# Patient Record
Sex: FEMALE | Race: WHITE | URBAN - METROPOLITAN AREA
[De-identification: names, ages, dates, MRNs, and addresses within clinical notes are randomized per-mention and may not be internally consistent; named-entity substitution may affect disease eponyms.]

---

## 2018-02-08 ENCOUNTER — OFFICE VISIT (OUTPATIENT)
Dept: ORTHOPEDIC SURGERY | Age: 78
End: 2018-02-08

## 2018-02-08 VITALS
DIASTOLIC BLOOD PRESSURE: 100 MMHG | BODY MASS INDEX: 29.82 KG/M2 | WEIGHT: 190 LBS | HEIGHT: 67 IN | TEMPERATURE: 85 F | SYSTOLIC BLOOD PRESSURE: 155 MMHG

## 2018-02-08 DIAGNOSIS — M25.532 WRIST PAIN, LEFT: Primary | ICD-10-CM

## 2018-02-08 DIAGNOSIS — S52.502A CLOSED FRACTURE OF DISTAL END OF LEFT RADIUS, UNSPECIFIED FRACTURE MORPHOLOGY, INITIAL ENCOUNTER: ICD-10-CM

## 2018-02-08 PROCEDURE — G8427 DOCREV CUR MEDS BY ELIG CLIN: HCPCS | Performed by: PHYSICIAN ASSISTANT

## 2018-02-08 PROCEDURE — 1090F PRES/ABSN URINE INCON ASSESS: CPT | Performed by: PHYSICIAN ASSISTANT

## 2018-02-08 PROCEDURE — G8419 CALC BMI OUT NRM PARAM NOF/U: HCPCS | Performed by: PHYSICIAN ASSISTANT

## 2018-02-08 PROCEDURE — 1123F ACP DISCUSS/DSCN MKR DOCD: CPT | Performed by: PHYSICIAN ASSISTANT

## 2018-02-08 PROCEDURE — G8484 FLU IMMUNIZE NO ADMIN: HCPCS | Performed by: PHYSICIAN ASSISTANT

## 2018-02-08 PROCEDURE — 99203 OFFICE O/P NEW LOW 30 MIN: CPT | Performed by: PHYSICIAN ASSISTANT

## 2018-02-08 PROCEDURE — 4040F PNEUMOC VAC/ADMIN/RCVD: CPT | Performed by: PHYSICIAN ASSISTANT

## 2018-02-08 PROCEDURE — 1036F TOBACCO NON-USER: CPT | Performed by: PHYSICIAN ASSISTANT

## 2018-02-08 PROCEDURE — L3660 SO 8 AB RSTR CAN/WEB PRE OTS: HCPCS | Performed by: PHYSICIAN ASSISTANT

## 2018-02-08 PROCEDURE — G8400 PT W/DXA NO RESULTS DOC: HCPCS | Performed by: PHYSICIAN ASSISTANT

## 2018-02-08 PROCEDURE — 29105 APPLICATION LONG ARM SPLINT: CPT | Performed by: PHYSICIAN ASSISTANT

## 2018-02-08 RX ORDER — DILTIAZEM HYDROCHLORIDE 120 MG/1
120 TABLET, FILM COATED ORAL
COMMUNITY

## 2018-02-08 RX ORDER — HYDROCODONE BITARTRATE AND ACETAMINOPHEN 5; 325 MG/1; MG/1
1 TABLET ORAL EVERY 6 HOURS PRN
Qty: 20 TABLET | Refills: 0 | Status: SHIPPED | OUTPATIENT
Start: 2018-02-08 | End: 2018-02-13

## 2018-02-08 RX ORDER — ASPIRIN 81 MG/1
81 TABLET, CHEWABLE ORAL DAILY
COMMUNITY

## 2018-02-08 RX ORDER — LORATADINE 10 MG/1
CAPSULE, LIQUID FILLED ORAL
COMMUNITY

## 2018-02-08 RX ORDER — FLUTICASONE PROPIONATE 50 MCG
SPRAY, SUSPENSION (ML) NASAL
COMMUNITY
Start: 2015-05-23

## 2018-02-08 RX ORDER — ATORVASTATIN CALCIUM 80 MG/1
TABLET, FILM COATED ORAL
COMMUNITY
Start: 2018-02-05 | End: 2018-02-08

## 2018-02-08 RX ORDER — METOPROLOL TARTRATE 100 MG/1
100 TABLET ORAL
COMMUNITY

## 2018-02-08 NOTE — PROGRESS NOTES
CHIEF COMPLAINT:    Chief Complaint   Patient presents with    Pain     LEFT WRIST- FELL THIS MORNING; OBVIOUS DEFORMITY, IMMEDIATE PAIN/SWELLING        HISTORY OF PRESENT ILLNESS:                The patient is a 69 y.o. female she presents today accompanied by her  to the after hours clinic [facility] for orthopedic evaluation of her LEFT wrist.  She fell just this afternoon prior to her arrival onto her outstretched left-hand. She noticed pain and limited range of motion and a deformity. No past medical history regarding previous LEFT wrist injury or trauma. She does have a history of lung cancer. She takes a blood thinnerEliquis  No past medical history on file. The pain assessment was noted & is as follows:  Pain Assessment  Location of Pain: Wrist  Location Modifiers: Left  Severity of Pain: 8  Quality of Pain: Sharp, Throbbing, Aching  Duration of Pain: Persistent  Frequency of Pain: Constant]      Work Status/Functionality:     Past Medical History: Medical history form was reviewed today & can be found in the media tab  No past medical history on file. Past Surgical History:     No past surgical history on file. Current Medications:     Current Outpatient Prescriptions:     apixaban (ELIQUIS) 5 MG TABS tablet, Take 5 mg by mouth, Disp: , Rfl:     diltiazem (CARDIZEM) 120 MG tablet, Take 120 mg by mouth, Disp: , Rfl:     fluticasone (FLONASE) 50 MCG/ACT nasal spray, PLACE 2 SPRAYS IN EACH NOSTRIL DAILY, Disp: , Rfl:     GABAPENTIN PO, Take 300 mg by mouth, Disp: , Rfl:     loratadine (CLARITIN) 10 MG capsule, Take by mouth, Disp: , Rfl:     metoprolol (LOPRESSOR) 100 MG tablet, Take 100 mg by mouth, Disp: , Rfl:     aspirin 81 MG chewable tablet, Take 81 mg by mouth daily, Disp: , Rfl:     HYDROcodone-acetaminophen (NORCO) 5-325 MG per tablet, Take 1 tablet by mouth every 6 hours as needed for Pain for up to 5 days .  Take lowest dose possible to manage pain., Disp: 20 tablet, Rfl:

## 2018-02-12 ENCOUNTER — OFFICE VISIT (OUTPATIENT)
Dept: ORTHOPEDIC SURGERY | Age: 78
End: 2018-02-12

## 2018-02-12 VITALS — HEIGHT: 67 IN | BODY MASS INDEX: 29.83 KG/M2 | WEIGHT: 190.04 LBS

## 2018-02-12 DIAGNOSIS — M25.532 LEFT WRIST PAIN: Primary | ICD-10-CM

## 2018-02-12 DIAGNOSIS — S52.552A OTHER CLOSED EXTRA-ARTICULAR FRACTURE OF DISTAL END OF LEFT RADIUS, INITIAL ENCOUNTER: ICD-10-CM

## 2018-02-12 PROCEDURE — 99203 OFFICE O/P NEW LOW 30 MIN: CPT | Performed by: ORTHOPAEDIC SURGERY

## 2018-02-12 PROCEDURE — 1036F TOBACCO NON-USER: CPT | Performed by: ORTHOPAEDIC SURGERY

## 2018-02-12 PROCEDURE — 1090F PRES/ABSN URINE INCON ASSESS: CPT | Performed by: ORTHOPAEDIC SURGERY

## 2018-02-12 PROCEDURE — G8400 PT W/DXA NO RESULTS DOC: HCPCS | Performed by: ORTHOPAEDIC SURGERY

## 2018-02-12 PROCEDURE — 4040F PNEUMOC VAC/ADMIN/RCVD: CPT | Performed by: ORTHOPAEDIC SURGERY

## 2018-02-12 PROCEDURE — G8427 DOCREV CUR MEDS BY ELIG CLIN: HCPCS | Performed by: ORTHOPAEDIC SURGERY

## 2018-02-12 PROCEDURE — G8484 FLU IMMUNIZE NO ADMIN: HCPCS | Performed by: ORTHOPAEDIC SURGERY

## 2018-02-12 PROCEDURE — 29075 APPL CST ELBW FNGR SHORT ARM: CPT | Performed by: ORTHOPAEDIC SURGERY

## 2018-02-12 PROCEDURE — 1123F ACP DISCUSS/DSCN MKR DOCD: CPT | Performed by: ORTHOPAEDIC SURGERY

## 2018-02-12 PROCEDURE — G8419 CALC BMI OUT NRM PARAM NOF/U: HCPCS | Performed by: ORTHOPAEDIC SURGERY

## 2018-02-20 ENCOUNTER — OFFICE VISIT (OUTPATIENT)
Dept: ORTHOPEDIC SURGERY | Age: 78
End: 2018-02-20

## 2018-02-20 VITALS — WEIGHT: 190.04 LBS | HEIGHT: 67 IN | BODY MASS INDEX: 29.83 KG/M2

## 2018-02-20 DIAGNOSIS — S52.552A OTHER CLOSED EXTRA-ARTICULAR FRACTURE OF DISTAL END OF LEFT RADIUS, INITIAL ENCOUNTER: ICD-10-CM

## 2018-02-20 DIAGNOSIS — M25.532 LEFT WRIST PAIN: Primary | ICD-10-CM

## 2018-02-20 PROCEDURE — G8419 CALC BMI OUT NRM PARAM NOF/U: HCPCS | Performed by: ORTHOPAEDIC SURGERY

## 2018-02-20 PROCEDURE — G8400 PT W/DXA NO RESULTS DOC: HCPCS | Performed by: ORTHOPAEDIC SURGERY

## 2018-02-20 PROCEDURE — 1036F TOBACCO NON-USER: CPT | Performed by: ORTHOPAEDIC SURGERY

## 2018-02-20 PROCEDURE — G8427 DOCREV CUR MEDS BY ELIG CLIN: HCPCS | Performed by: ORTHOPAEDIC SURGERY

## 2018-02-20 PROCEDURE — 4040F PNEUMOC VAC/ADMIN/RCVD: CPT | Performed by: ORTHOPAEDIC SURGERY

## 2018-02-20 PROCEDURE — 99213 OFFICE O/P EST LOW 20 MIN: CPT | Performed by: ORTHOPAEDIC SURGERY

## 2018-02-20 PROCEDURE — 1090F PRES/ABSN URINE INCON ASSESS: CPT | Performed by: ORTHOPAEDIC SURGERY

## 2018-02-20 PROCEDURE — 1123F ACP DISCUSS/DSCN MKR DOCD: CPT | Performed by: ORTHOPAEDIC SURGERY

## 2018-02-20 PROCEDURE — G8484 FLU IMMUNIZE NO ADMIN: HCPCS | Performed by: ORTHOPAEDIC SURGERY

## 2018-02-28 ENCOUNTER — OFFICE VISIT (OUTPATIENT)
Dept: ORTHOPEDIC SURGERY | Age: 78
End: 2018-02-28

## 2018-02-28 VITALS — WEIGHT: 190.04 LBS | BODY MASS INDEX: 29.83 KG/M2 | HEIGHT: 67 IN

## 2018-02-28 DIAGNOSIS — M25.532 LEFT WRIST PAIN: Primary | ICD-10-CM

## 2018-02-28 DIAGNOSIS — S52.552D CLOSED EXTRA-ARTICULAR FRACTURE OF DISTAL END OF LEFT RADIUS WITH ROUTINE HEALING, SUBSEQUENT ENCOUNTER: ICD-10-CM

## 2018-02-28 PROBLEM — S52.552A CLOSED EXTRAARTICULAR FRACTURE OF DISTAL END OF LEFT RADIUS: Status: ACTIVE | Noted: 2018-02-28

## 2018-02-28 PROCEDURE — 1036F TOBACCO NON-USER: CPT | Performed by: ORTHOPAEDIC SURGERY

## 2018-02-28 PROCEDURE — G8400 PT W/DXA NO RESULTS DOC: HCPCS | Performed by: ORTHOPAEDIC SURGERY

## 2018-02-28 PROCEDURE — 4040F PNEUMOC VAC/ADMIN/RCVD: CPT | Performed by: ORTHOPAEDIC SURGERY

## 2018-02-28 PROCEDURE — G8419 CALC BMI OUT NRM PARAM NOF/U: HCPCS | Performed by: ORTHOPAEDIC SURGERY

## 2018-02-28 PROCEDURE — 1123F ACP DISCUSS/DSCN MKR DOCD: CPT | Performed by: ORTHOPAEDIC SURGERY

## 2018-02-28 PROCEDURE — G8484 FLU IMMUNIZE NO ADMIN: HCPCS | Performed by: ORTHOPAEDIC SURGERY

## 2018-02-28 PROCEDURE — 1090F PRES/ABSN URINE INCON ASSESS: CPT | Performed by: ORTHOPAEDIC SURGERY

## 2018-02-28 PROCEDURE — G8427 DOCREV CUR MEDS BY ELIG CLIN: HCPCS | Performed by: ORTHOPAEDIC SURGERY

## 2018-02-28 PROCEDURE — 99213 OFFICE O/P EST LOW 20 MIN: CPT | Performed by: ORTHOPAEDIC SURGERY

## 2018-02-28 NOTE — PROGRESS NOTES
Assessment: Healing left distal radius fracture which had minimal increase in shortening on last week's visit with Dr. Ricardo Garibay while I was out of town but there has been no substantial change this week. She also has bruising on her contralateral right arm around the elbow with no evidence of fracture    Treatment Plan: Were going to leave her in the short arm cast for another 3 weeks and instruct her on avoiding any strenuous gripping or lifting with this hand    Return in about 3 weeks (around 3/21/2018) for X-ray next visit out of cast.         History of Present Illness  Aren Carvalho is a 69 y.o. female. Patient's very nice lady that we have been casting now for 2 full weeks in her cast and this Thursday will be 3 weeks since her injury. She feels like the cast is loose and slightly but the swelling in her fingers is gone down nicely. She also inquired about the deep bruising that she has in the posterior aspect of her right arm and laterally over her elbow. She really has almost no pain in this area the bruising is just come up to the surface    Review of Systems  Complete Review of Systems performed and is non-contributory except for what is noted in HPI. Vital Signs  Vitals:    02/28/18 0935   Weight: 190 lb 0.6 oz (86.2 kg)   Height: 5' 6.93\" (1.7 m)     Body mass index is 29.83 kg/m². Physical Exam  Constitutional:  Patient is well-nourished and demonstrates normal hygiene. Mental Status:  Patient is alert and oriented to person, place and time. Skin:  Intact, no rashes or lesions. Hand Examination: She has good finger range of motion of the left hand. Normal capillary refill. Neurologic exam shows no numbness or tingling. Examining the right arm shows bruising in the posterior aspect of the upper arm just above the elbow as well as laterally over the area lateral epicondyle.   She has full range of motion of the elbow full pronation and supination and no real tenderness

## 2018-03-21 ENCOUNTER — HOSPITAL ENCOUNTER (OUTPATIENT)
Dept: OCCUPATIONAL THERAPY | Age: 78
Discharge: HOME | End: 2018-03-31
Admitting: ORTHOPAEDIC SURGERY

## 2018-03-21 ENCOUNTER — OFFICE VISIT (OUTPATIENT)
Dept: ORTHOPEDIC SURGERY | Age: 78
End: 2018-03-21

## 2018-03-21 VITALS — HEIGHT: 67 IN | WEIGHT: 190.04 LBS | BODY MASS INDEX: 29.83 KG/M2

## 2018-03-21 DIAGNOSIS — S52.552D CLOSED EXTRA-ARTICULAR FRACTURE OF DISTAL END OF LEFT RADIUS WITH ROUTINE HEALING, SUBSEQUENT ENCOUNTER: Primary | ICD-10-CM

## 2018-03-21 PROCEDURE — L3908 WHO COCK-UP NONMOLDE PRE OTS: HCPCS | Performed by: ORTHOPAEDIC SURGERY

## 2018-03-21 PROCEDURE — G8484 FLU IMMUNIZE NO ADMIN: HCPCS | Performed by: ORTHOPAEDIC SURGERY

## 2018-03-21 PROCEDURE — 4040F PNEUMOC VAC/ADMIN/RCVD: CPT | Performed by: ORTHOPAEDIC SURGERY

## 2018-03-21 PROCEDURE — 1090F PRES/ABSN URINE INCON ASSESS: CPT | Performed by: ORTHOPAEDIC SURGERY

## 2018-03-21 PROCEDURE — 99213 OFFICE O/P EST LOW 20 MIN: CPT | Performed by: ORTHOPAEDIC SURGERY

## 2018-03-21 PROCEDURE — 1036F TOBACCO NON-USER: CPT | Performed by: ORTHOPAEDIC SURGERY

## 2018-03-21 PROCEDURE — G8400 PT W/DXA NO RESULTS DOC: HCPCS | Performed by: ORTHOPAEDIC SURGERY

## 2018-03-21 PROCEDURE — 1123F ACP DISCUSS/DSCN MKR DOCD: CPT | Performed by: ORTHOPAEDIC SURGERY

## 2018-03-21 PROCEDURE — G8419 CALC BMI OUT NRM PARAM NOF/U: HCPCS | Performed by: ORTHOPAEDIC SURGERY

## 2018-03-21 PROCEDURE — G8427 DOCREV CUR MEDS BY ELIG CLIN: HCPCS | Performed by: ORTHOPAEDIC SURGERY

## 2018-03-23 ENCOUNTER — HOSPITAL ENCOUNTER (OUTPATIENT)
Dept: OCCUPATIONAL THERAPY | Age: 78
Discharge: HOME | End: 2018-03-24
Admitting: ORTHOPAEDIC SURGERY

## 2018-03-23 NOTE — FLOWSHEET NOTE
a score of 25 % or less on the Quick DASH disability questionnaire for increased performance with carrying, moving, and handling objects. 3) Pt will demonstrate increased ROM to full finger and thumb, wrist 60/60. and forearm 80/80 on left for improved ability to to kitchen tasks. Garcia Ramey 4) Pt will demonstrate increased strength to lefty  60% of right for improved ability to return to bowling. 5) Pt will have a decrease in pain to1/10 to facilitate improvement in using left hand as assist in cleaning tasks at home. Garcia Ramey 6)    Progression Towards Functional goals:  [x] Patient is progressing as expected towards functional goals listed. [] Progression is slowed due to complexities listed. [] Progression has been slowed due to co-morbidities.   [] Plan just implemented, too soon to assess goals progression  [] Other:     ASSESSMENT:    Treatment/Activity Tolerance:  [x] Patient tolerated treatment well [] Patient limited by fatique  [] Patient limited by pain  [] Patient limited by other medical complications  [] Other:     Prognosis: [] Good [] Fair  [] Poor    Patient Requires Follow-up: [x] Yes  [] No    PLAN: Recommend Occupational Therapy 2 times a week for 5 weeks  [x] Continue per plan of care [] Alter current plan (see comments)  [] Plan of care initiated [] Hold pending MD visit [] Discharge    Plan for next session: progress ROM and light resistive activities   Thermal modalities, functional activities and strengthening as inicated, AROM, PROM as indicated    Electronically signed by: Nirav Decker

## 2018-03-26 ENCOUNTER — HOSPITAL ENCOUNTER (OUTPATIENT)
Dept: OCCUPATIONAL THERAPY | Age: 78
Discharge: HOME | End: 2018-03-27
Admitting: ORTHOPAEDIC SURGERY

## 2018-03-29 ENCOUNTER — HOSPITAL ENCOUNTER (OUTPATIENT)
Dept: OCCUPATIONAL THERAPY | Age: 78
Discharge: HOME | End: 2018-03-30
Admitting: ORTHOPAEDIC SURGERY

## 2018-03-29 NOTE — FLOWSHEET NOTE
[facility], [address] [place] [address] [place]  Phone: [phone]  Fax [phone]  Hand Therapy Daily Treatment Note  Date:  3/29/2018    Patient: Freedom Warner   : 1940   MRN: [id number]  Referring Physician: Referring Practitioner: Dr. Opal Arreguin Diagnosis Information:   Diagnosis: S52.552D (ICD-10-CM) - Closed extra-articular fracture of distal end of left radius with routine healing,   M25.632 left wrist stiffness         Date of injury: 18  Date of Surgery/Type of procedure: none                                     Insurance information:OT Insurance Information: Medicare   Comorbidities Affecting Functional Performance:     []Anxiety (F41.9)/Depression (F32.9)   []Diabetes Type 1(E10.65) or 2 (E11.65)   []Rheumatoid Arthritis (M05.9)  []Fibromyalgia (M79.7)  []Neuropathy(G60.9)  []Osteoarthritis(M19.91)  [x]None   []Other:    G-code: OT G-codes  Functional Assessment Tool Used: QuickDASH  Score: 48  Functional Limitation: Carrying, moving and handling objects  Carrying, Moving and Handling Objects Current Status (): At least 40 percent but less than 60 percent impaired, limited or restricted  Carrying, Moving and Handling Objects Goal Status (): At least 20 percent but less than 40 percent impaired, limited or restricted    Date of Patient follow up with Physician:  Preferred Language for Healthcare:   [x]English       []other:  Latex Allergy:  [x]NO      []YES  RESTRICTIONS/PRECAUTIONS:   NONE  Progress Note: [x]  Yes  []  No  Next due by : Visit #10       Visit # Insurance Allowable Requires auth   4 BMN    no:[x]                  yes:[]      Pain level: 2/10 intermittent     SUBJECTIVE:  Stiffness continues and is sore at distal ulna mostly. Still has edema as a deficit area.     OBJECTIVE:    Date:  Hand Dominance:     [x]  Right    [] Left 3/21/2018    3/26/18    Objective Measures: 48    PAIN 2/10    Quick DASH %    Digits tip to DPFC in cm Index: 2.5  Lon.0  Rin.0  Small: 1.0 Touching palm   Thumb ROM MP 40  IP 35    Thumb opposition  R:  L: to tip of ring    Thumb Radial/Palmar abd ROM R:  L: 40    Wrist ROM Ext/Flex R:  L: 30/25 R:60/60  L:55/35   Rad/Uln dev ROM R:  L: 1010    Forearm ROM  Sup/pron R:  L:65/65    Elbow ROM Ext/flex    WNL    Shoulder Flex  Shoulder Abd  Shoulder IR/ER WNL    Edema in cm circumf. MCPJs R:  L: moderate by visual inspection    Edema in cm circumf. Wrist R:  L: moderate by visual inspection     strength in lbs R:  L: defer    Pinch Strengthin lbs: lat  R:  L:    Pinch Strength in lbs:  3 point R:  L:           Modalities: 3/21/18   3/23/18  3/26/18  3/29/18    Moist hot pack 10' to make tissues more extensible 10' 10 10          Therapeutic Exercise & Activities:       Gentle PROM On table, performed and instructed in for home X 10 each wrist flex/ext  Prom, prayer stretch Wrist prom, aarom   AROM Fingers, wrist and forearm Wrist and FA x 5'     Grasp and release foam blocks   5'     s/p   mallet    putty   mallet Press, roll, , pinch Putty roll, mallet, pinch,        Flex bar      Edema glove-small                           Therapeutic Exercise and NMR EXR  [x] (35587) Provided verbal/tactile cueing for activities related to strengthening, flexibility, endurance, ROM  for improvements in scapular, scapulothoracic and UE control with self care, reaching, carrying, lifting, house/yardwork, driving/computer work.    [] (51162) Provided verbal/tactile cueing for activities related to improving balance, coordination, kinesthetic sense, posture, motor skill, proprioception  to assist with  scapular, scapulothoracic and UE control with self care, reaching, carrying, lifting, house/yardwork, driving/computer work. Therapeutic Activities:    [] ( 36824) therapeutic activities, direct (one on one) patient contact.  Use of dynamic activities to improve

## 2018-04-01 ENCOUNTER — HOSPITAL ENCOUNTER (OUTPATIENT)
Dept: OCCUPATIONAL THERAPY | Age: 78
Discharge: HOME | End: 2018-04-19
Attending: ORTHOPAEDIC SURGERY | Admitting: ORTHOPAEDIC SURGERY

## 2018-04-03 ENCOUNTER — HOSPITAL ENCOUNTER (OUTPATIENT)
Dept: OCCUPATIONAL THERAPY | Age: 78
Discharge: HOME | End: 2018-04-04
Admitting: ORTHOPAEDIC SURGERY

## 2018-04-09 ENCOUNTER — HOSPITAL ENCOUNTER (OUTPATIENT)
Dept: OCCUPATIONAL THERAPY | Age: 78
Discharge: HOME | End: 2018-04-10
Admitting: ORTHOPAEDIC SURGERY

## 2018-04-13 ENCOUNTER — HOSPITAL ENCOUNTER (OUTPATIENT)
Dept: OCCUPATIONAL THERAPY | Age: 78
Discharge: HOME | End: 2018-04-14
Admitting: ORTHOPAEDIC SURGERY

## 2018-04-18 ENCOUNTER — OFFICE VISIT (OUTPATIENT)
Dept: ORTHOPEDIC SURGERY | Age: 78
End: 2018-04-18

## 2018-04-18 ENCOUNTER — HOSPITAL ENCOUNTER (OUTPATIENT)
Dept: OCCUPATIONAL THERAPY | Age: 78
Discharge: HOME | End: 2018-04-19
Admitting: ORTHOPAEDIC SURGERY

## 2018-04-18 VITALS — WEIGHT: 190 LBS | BODY MASS INDEX: 29.82 KG/M2 | HEIGHT: 67 IN

## 2018-04-18 DIAGNOSIS — S52.552D CLOSED EXTRA-ARTICULAR FRACTURE OF DISTAL END OF LEFT RADIUS WITH ROUTINE HEALING, SUBSEQUENT ENCOUNTER: Primary | ICD-10-CM

## 2018-04-18 PROCEDURE — 4040F PNEUMOC VAC/ADMIN/RCVD: CPT | Performed by: ORTHOPAEDIC SURGERY

## 2018-04-18 PROCEDURE — 1123F ACP DISCUSS/DSCN MKR DOCD: CPT | Performed by: ORTHOPAEDIC SURGERY

## 2018-04-18 PROCEDURE — G8419 CALC BMI OUT NRM PARAM NOF/U: HCPCS | Performed by: ORTHOPAEDIC SURGERY

## 2018-04-18 PROCEDURE — 1036F TOBACCO NON-USER: CPT | Performed by: ORTHOPAEDIC SURGERY

## 2018-04-18 PROCEDURE — 1090F PRES/ABSN URINE INCON ASSESS: CPT | Performed by: ORTHOPAEDIC SURGERY

## 2018-04-18 PROCEDURE — G8400 PT W/DXA NO RESULTS DOC: HCPCS | Performed by: ORTHOPAEDIC SURGERY

## 2018-04-18 PROCEDURE — 99213 OFFICE O/P EST LOW 20 MIN: CPT | Performed by: ORTHOPAEDIC SURGERY

## 2018-04-18 PROCEDURE — G8427 DOCREV CUR MEDS BY ELIG CLIN: HCPCS | Performed by: ORTHOPAEDIC SURGERY

## 2021-06-11 NOTE — PROGRESS NOTES
Assessment: Left distal radius fracture with alignment but definitely improved after sugar tong splinting in after hours clinic. She now has only about less than 5° dorsal tilt and good restoration of radial length. Treatment Plan: Her splint was causing significant discomfort in her fingers and so we have switched her to a well molded short arm cast today. Given her health history of prior myocardial infarction as well as being on Eloquis for atrial fib, we are going to try to the conservative with this fracture if possible. I made sure that the patient and her  understood that she might have some cosmetic deformity but should still have a reasonable functional result with this fracture. I'm going to be out of town next week so I would like for her to see one of my partners early in the week for repeat x-rays just to be certain that this has not shifted significantly. I did discuss with the patient and her  that this is indeed likely to settle somewhat given her age and initial fracture anatomy. They understand that she is going to have cosmetic deficit but functionally I think this should give her an adequate result    Return in about 1 week (around 2/19/2018) for Dr. Radha Estrada, [address] [place] [address] Way next visit. History of Present Illness  Edith Hodges is a 69 y.o. female. Patient's very nice lady who fell last week and was seen on the 8th in after hours clinic. She had a left distal radius fracture and was placed in a sugar tong splint with significant molding at that time. Overall she's done fairly well she has some aching discomfort in the wrist but only required a few pain pills. She has no real numbness or tingling.     As noted above her past medical history is significant for having had prior cancer she has also had a myocardial infarction about 5 years ago and is on blood thinners for atrial fib    Review of Systems  Complete Review of Systems performed and is non-contributory (4) no impairment